# Patient Record
Sex: MALE | Race: BLACK OR AFRICAN AMERICAN | Employment: FULL TIME | URBAN - METROPOLITAN AREA
[De-identification: names, ages, dates, MRNs, and addresses within clinical notes are randomized per-mention and may not be internally consistent; named-entity substitution may affect disease eponyms.]

---

## 2021-02-14 NOTE — PROGRESS NOTES
Lilia Gregory is a 31 y.o. male who was seen by synchronous (real-time) audio-video technology on 2/15/2021 for Establish Care and Headache (off and on maybe for 1 year)    Assessment & Plan:     1. Episodic tension-type headache, not intractable  Assessment & Plan:  Stable but persists, trial NSAIDs for now, take with food  DO NOT take any OTC ibuprofen products    Orders:  -     METABOLIC PANEL, COMPREHENSIVE; Future  -     CBC WITH AUTOMATED DIFF; Future  -     naproxen (NAPROSYN) 500 mg tablet; Take 1 Tab by mouth two (2) times daily (with meals) for 30 days. , Normal, Disp-60 Tab, R-0    2. Other tobacco product nicotine dependence, uncomplicated  Assessment & Plan:  Cessation advised    Orders:  -     CBC WITH AUTOMATED DIFF; Future    3. Screening for lipid disorders  -     LIPID PANEL; Future    4. Encounter to establish care  Comments:  Complete fasting labs as ordered     Follow-up and Dispositions    · Return in about 4 weeks (around 3/15/2021) for headaches, lab results.        SUBJECTIVE:     HPI    Previous PCP: None  Reason for switching:  N/A    Social Hx:  Occupation-  and business owner  Household- lives with family, , has a daughter (10 yo)    Past Medical Hx:  None     Surgical Hx:  None     Specialists:  None    Family Hx:  HTN- father  Diabetes- none  HLD- none  MI- none  Stroke - none  CAD- none  Cancer- none  Mental Health Disorder- none    Preventive:  Last eye exam- 2020  Last dental exam- a year ago  Flu vaccine- due now, recommended  Tetanus vaccine - within the last 10 years  Pneumonia vaccine- due now, recommended  MyChart Activation- already activated    HEALTH CONCERNS:    Headaches/Migraines-  Onset: 1 year ago, on and off  Location: frontal  Duration: up to 24 hours  Characteristics: throbbing  Frequency: 2-3 times per week  Associated symptoms:  none  Aggravating factors:  lights  Relieving factors: sleeping  Treatment: BC powder  Pain Ratin/10 today, but usually /10  Admits to some caffeine use    Nicotine Dependence-  Years of smokin years  Smoking cigars  Amount:  2 cigars per day  Previous attempt to quit:  Yes, motivated to stop again  Past medications used for cessation:  no    Patient Active Problem List   Diagnosis Code    Episodic tension-type headache, not intractable G44.219    Nicotine dependence F17.200       No Known Allergies   History reviewed. No pertinent past medical history.    Social History     Socioeconomic History    Marital status:      Spouse name: Not on file    Number of children: Not on file    Years of education: Not on file    Highest education level: Not on file   Occupational History    Not on file   Social Needs    Financial resource strain: Not on file    Food insecurity     Worry: Not on file     Inability: Not on file    Transportation needs     Medical: Not on file     Non-medical: Not on file   Tobacco Use    Smoking status: Current Every Day Smoker     Years: 4.00     Types: Cigars    Smokeless tobacco: Never Used   Substance and Sexual Activity    Alcohol use: Yes     Frequency: 2-4 times a month     Drinks per session: 3 or 4     Binge frequency: Less than monthly    Drug use: Never    Sexual activity: Yes     Partners: Female   Lifestyle    Physical activity     Days per week: Not on file     Minutes per session: Not on file    Stress: Not on file   Relationships    Social connections     Talks on phone: Not on file     Gets together: Not on file     Attends [employer] service: Not on file     Active member of club or organization: Not on file     Attends meetings of clubs or organizations: Not on file     Relationship status: Not on file    Intimate partner violence     Fear of current or ex partner: Not on file     Emotionally abused: Not on file     Physically abused: Not on file     Forced sexual activity: Not on file   Other Topics Concern    Not on file   Social History Narrative    Not on file      History reviewed. No pertinent surgical history. Family History   Problem Relation Age of Onset    Hypertension Father         Review of Systems   Constitutional: Negative for chills, fever and malaise/fatigue. Eyes: Negative for blurred vision and double vision. Respiratory: Negative for shortness of breath. Cardiovascular: Negative for chest pain. Gastrointestinal: Negative for nausea and vomiting. Neurological: Positive for headaches. Negative for dizziness and weakness. OBJECTIVE:     Physical Exam   Constitutional: Stable-appearing, in no distress, alert and oriented  HENT:   Head: Normocephalic and atraumatic. Ears:  Hearing grossly intact. Mouth:  No visible perioral lesions, cyanosis, or lip swelling. Pulmonary/Chest: Does not appear dyspneic, no audible wheezes or nasal flaring. Musculoskeletal: Grossly normal active ROM in upper extremities. Neurological:  Intact recent memory, no facial or eyelid drooping, no speech impairment, answering questions appropriately. Psychiatric: Judgment and insight good, normal mood and affect. We discussed the expected course, resolution and complications of the diagnosis(es) in detail. Medication risks, benefits, costs, interactions, and alternatives were discussed as indicated. I advised him to contact the office if his condition worsens, changes or fails to improve as anticipated. He expressed understanding with the diagnosis(es) and plan. Savita Alfred, who was evaluated through a synchronous (real-time) audio-video encounter, and/or his healthcare decision maker, is aware that it is a billable service, with coverage as determined by his insurance carrier. provided verbal consent to proceed: Yes, and patient identification was verified.  It was conducted pursuant to the emergency declaration under the [facility], [facility] waiver authority and the Coronavirus Preparedness and Response Supplemental Appropriations Act. A caregiver was present when appropriate. Ability to conduct physical exam was limited. I was at home. The patient was at home.     MONSE Kothair

## 2021-02-15 ENCOUNTER — VIRTUAL VISIT (OUTPATIENT)
Dept: FAMILY MEDICINE CLINIC | Age: 26
End: 2021-02-15
Payer: COMMERCIAL

## 2021-02-15 DIAGNOSIS — Z13.220 SCREENING FOR LIPID DISORDERS: ICD-10-CM

## 2021-02-15 DIAGNOSIS — F17.290 OTHER TOBACCO PRODUCT NICOTINE DEPENDENCE, UNCOMPLICATED: ICD-10-CM

## 2021-02-15 DIAGNOSIS — Z76.89 ENCOUNTER TO ESTABLISH CARE: ICD-10-CM

## 2021-02-15 DIAGNOSIS — G44.219 EPISODIC TENSION-TYPE HEADACHE, NOT INTRACTABLE: Primary | ICD-10-CM

## 2021-02-15 PROBLEM — F17.200 NICOTINE DEPENDENCE: Status: ACTIVE | Noted: 2021-02-15

## 2021-02-15 PROCEDURE — 99203 OFFICE O/P NEW LOW 30 MIN: CPT | Performed by: NURSE PRACTITIONER

## 2021-02-15 RX ORDER — NAPROXEN 500 MG/1
500 TABLET ORAL 2 TIMES DAILY WITH MEALS
Qty: 60 TAB | Refills: 0 | Status: SHIPPED | OUTPATIENT
Start: 2021-02-15 | End: 2021-03-17

## 2021-02-15 NOTE — PROGRESS NOTES
Reno Aguirre presents today for   Chief Complaint   Patient presents with   [facility]    Headache     off and on maybe for 1 year       Reno Aguirre preferred language for health care discussion is english/other. Is someone accompanying this pt? no    Is the patient using any DME equipment during [address]? no    Depression Screening:  3 most recent PHQ Screens 2/15/2021   Little interest or pleasure in doing things Not at all   Feeling down, depressed, irritable, or hopeless Not at all   Total Score PHQ 2 0       Learning Assessment:  Learning Assessment 2/15/2021   PRIMARY LEARNER Patient   HIGHEST LEVEL OF EDUCATION - PRIMARY LEARNER  [id number] PRIMARY LEARNER NONE   CO-LEARNER CAREGIVER No   PRIMARY LANGUAGE ENGLISH    NEED No   LEARNER PREFERENCE PRIMARY DEMONSTRATION   ANSWERED BY patient   RELATIONSHIP SELF       Abuse Screening:  Abuse Screening Questionnaire 2/15/2021   Do you ever feel afraid of your partner? N   Are you in a relationship with someone who physically or mentally threatens you? N   Is it safe for you to go home? Y       Generalized Anxiety  No flowsheet data found. Health Maintenance Due   Topic Date Due    COVID-19 Vaccine (1 of 2) 02/14/2010    DTaP/Tdap/Td series (1 - Tdap) 02/14/2015    Flu Vaccine (1) 09/01/2020   . Health Maintenance reviewed and discussed and ordered per Provider. Advance Directive:  1. Do you have an advance directive in place?  Patient Reply:no

## 2021-12-07 ENCOUNTER — OFFICE VISIT (OUTPATIENT)
Dept: INTERNAL MEDICINE CLINIC | Age: 26
End: 2021-12-07
Payer: MEDICAID

## 2021-12-07 VITALS
BODY MASS INDEX: 33.3 KG/M2 | WEIGHT: 224.8 LBS | HEART RATE: 75 BPM | TEMPERATURE: 97.5 F | SYSTOLIC BLOOD PRESSURE: 135 MMHG | DIASTOLIC BLOOD PRESSURE: 75 MMHG | RESPIRATION RATE: 18 BRPM | OXYGEN SATURATION: 99 % | HEIGHT: 69 IN

## 2021-12-07 DIAGNOSIS — Z00.00 ANNUAL PHYSICAL EXAM: ICD-10-CM

## 2021-12-07 DIAGNOSIS — Z72.0 TOBACCO ABUSE: ICD-10-CM

## 2021-12-07 DIAGNOSIS — E66.9 OBESITY (BMI 30-39.9): ICD-10-CM

## 2021-12-07 DIAGNOSIS — G44.219 EPISODIC TENSION-TYPE HEADACHE, NOT INTRACTABLE: Primary | ICD-10-CM

## 2021-12-07 DIAGNOSIS — R63.5 WEIGHT GAIN: ICD-10-CM

## 2021-12-07 PROCEDURE — 99204 OFFICE O/P NEW MOD 45 MIN: CPT | Performed by: INTERNAL MEDICINE

## 2021-12-07 PROCEDURE — 99385 PREV VISIT NEW AGE 18-39: CPT | Performed by: INTERNAL MEDICINE

## 2021-12-07 RX ORDER — BUPROPION HYDROCHLORIDE 150 MG/1
150 TABLET, EXTENDED RELEASE ORAL 2 TIMES DAILY
Qty: 53 TABLET | Refills: 0 | Status: SHIPPED | OUTPATIENT
Start: 2021-12-07

## 2021-12-07 RX ORDER — METHOCARBAMOL 500 MG/1
500 TABLET, FILM COATED ORAL 2 TIMES DAILY
Qty: 60 TABLET | Refills: 2 | Status: SHIPPED | OUTPATIENT
Start: 2021-12-07

## 2021-12-07 NOTE — PROGRESS NOTES
1. Episodic tension-type headache, not intractable  An acute on chronic problem. We discussed at length the need for him to start working out again especially given that he did not have these tension headaches while he worked out. He also needs to stretch aggressively and I demonstrated how to do that. I am also going to start Robaxin that he can take up to twice daily. - methocarbamoL (ROBAXIN) 500 mg tablet; Take 1 Tablet by mouth two (2) times a day. Dispense: 60 Tablet; Refill: 2    2. Tobacco abuse  This is an acute problem. We discussed his tobacco use and we will start Zyban 150 mg daily for 7 days and will increase the dose to twice daily for the remainder. We are going to set up a virtual follow-up visit within the next 4 weeks  - buPROPion SR (WELLBUTRIN SR) 150 mg SR tablet; Take 1 Tablet by mouth two (2) times a day. 1 tab daily for 7 days. Then 1 tablet twice a day thereafter. Dispense: 53 Tablet; Refill: 0    3. Obesity (BMI 30-39. 9)  Discussed his nutrition at length. I am concerned regarding his weight gain. He has a strong family history of diabetes. Will refer to nutrition  - REFERRAL TO NUTRITION    4. Weight gain  He reports over 30 pounds of weight loss over the past year. Additionally he has a family history of multiple medical problems. I am going to check a TSH to make sure his thyroid is normal  - TSH 3RD GENERATION    5. Annual physical exam  The patient elected to have his annual physical exam done today. He is aware that there will be 2 charges. 1 for the new patient encounter. The other will be for his annual physical which should be covered by his insurance plan. I am going to check the labs noted below. I also educated him on testicular self exams.   He declined a testicular self check today  - HEMOGLOBIN A1C WITH EAG  - METABOLIC PANEL, COMPREHENSIVE  - LIPID PANEL  - CBC WITH AUTOMATED DIFF      Chief Complaint   Patient presents with    New Patient        HPI This is a very pleasant 58-year-old gentleman with a history of obesity weight gain and tension headaches. He reports over the past several months he has been having these headaches 2-3 times per month. They start over his forehead then and packed the middle part of his head and then he feels it in his neck. 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 He also reports she has gained about 30 pounds over the past year. He denies any vision changes or paresthesias in his extremity. He denies any chest pain palpitations or shortness of breath. He admits his weight is become a problem and he has a significant family history of diabetes poorly controlled hypertension and also possible hypothyroidism. The patient also reports he has been smoking quite a bit. He is actively seeking treatment for this and we had a long discussion regarding his tobacco use  Patient Active Problem List   Diagnosis Code    Episodic tension-type headache, not intractable G44.219    Nicotine dependence F17.200        No current outpatient medications on file prior to visit. No current facility-administered medications on file prior to visit. ROS  - GEN: + weight gain, no fevers or chills  - HEENT: no vision changes, no tinnitus, no sore throat  - CV: no cp, palpitations or edema  - RESP: no sob, cough  - ABD: no n/v/d, no blood in stool  - : no dysuria or changes in freq. - SKIN: no rashes, ulcers  - Neuro: no resting tremors, parasthesia in extremities, + headaches  - MS: No weakness in extremities, no gait abnormalities  - Psych: negative for depression or anxiety      Visit Vitals  /75   Pulse 75   Temp 97.5 °F (36.4 °C)   Resp 18   Ht 5' 9\" (1.753 m)   Wt 224 lb 12.8 oz (102 kg)   SpO2 99%   BMI 33.20 kg/m²           Physical Exam  Constitutional:       Appearance: Normal appearance. Morbidly obese. NAD and pleasant  HENT:      Head: Normocephalic. Nose: Nose normal.      Mouth/Throat:      Mouth: Mucous membranes are moist. Throat not inflammed  Eyes:      Extraocular Movements: Extraocular movements intact. Conjunctiva/sclera: Conjunctivae normal. Sclera anicteric     Pupils: Pupils are equal, round, and reactive to light. Cardiovascular:      Rate and Rhythm: Normal rate and regular rhythm. Pulses: Normal pulses. Pulmonary:      Effort: No respiratory distress. Breath sounds: CTAB and No stridor. No rhonchi. Abdominal:      General: There is no distension. NT, ND  Neurological:      Mental Status: patient is alert and oriented times 3.  No resting tremor, normal gait     Cranial Nerves: cranial nerves grossly intact  Muskuloskeletal     Full ROM in extremities     Normal gait  Skin     Dry without lesions on examined areas, warm to the touch       Deferred  Psychiatry     Calm, normal affect, interacting normally

## 2022-01-04 ENCOUNTER — VIRTUAL VISIT (OUTPATIENT)
Dept: INTERNAL MEDICINE CLINIC | Age: 27
End: 2022-01-04
Payer: MEDICAID

## 2022-01-04 DIAGNOSIS — E66.9 OBESITY (BMI 30-39.9): ICD-10-CM

## 2022-01-04 DIAGNOSIS — Z72.0 TOBACCO ABUSE: Primary | ICD-10-CM

## 2022-01-04 PROCEDURE — 99442 PR PHYS/QHP TELEPHONE EVALUATION 11-20 MIN: CPT | Performed by: INTERNAL MEDICINE

## 2022-01-04 NOTE — PROGRESS NOTES
I was at my office in [place], [place] while conducting this encounter. The patient is at home. Consent:  Patient and/or HIS/HER healthcare decision maker is aware that this patient-initiated Telehealth encounter is a billable service, with coverage as determined by patient's insurance carrier. Patient is aware that He/She may receive a bill and has provided verbal consent to proceed: Consent has been obtained within past 12 months of the date of this encounter. This virtual visit was conducted via Telephone    1. Tobacco abuse  The patient has quit smoking and I am so happy and proud of him. He did a cold turkey    2. Obesity (BMI 30-39. 9)  Discussed weight reduction. I have set a short-term goal of 15 pounds of weight loss. At some point I would like to see him below 180 and possibly as low as 170. He is going to start working on that       Chief Complaint   Patient presents with    Follow-up     smoking sessation        HPI   This is a pleasant 48-year-old gentleman who presents for follow-up after he had a smoking cessation discussion during our last meeting. He reports he is cut down completely and has not smoked at all. He literally did a cold turkey. He even stopped taking the Zyban I prescribed for him. He reports he is turned his sun seeds as his go to [employer]. Otherwise reports he is doing well. He is also looking forward to the new year and losing some weight. He and I did discuss weight reduction  Current Outpatient Medications on File Prior to Visit   Medication Sig Dispense Refill    buPROPion SR (WELLBUTRIN SR) 150 mg SR tablet Take 1 Tablet by mouth two (2) times a day. 1 tab daily for 7 days. Then 1 tablet twice a day thereafter. (Patient not taking: Reported on 1/4/2022) 53 Tablet 0    methocarbamoL (ROBAXIN) 500 mg tablet Take 1 Tablet by mouth two (2) times a day.  (Patient not taking: Reported on 1/4/2022) 60 Tablet 2     No current facility-administered medications on file prior to visit.         Patient Active Problem List   Diagnosis Code    Episodic tension-type headache, not intractable G44.219    Nicotine dependence F17.200             Total Time Spent on this Encounter: total time spent was approx 12 minutes

## 2022-01-04 NOTE — PROGRESS NOTES
patient states he has stopped smoking and doesn't even take the pills to stop smoking      Kesha Donaldson presents today for   Chief Complaint   Patient presents with    Follow-up     smoking sessation       Depression Screening:  3 most recent PHQ Screens 1/4/2022   Little interest or pleasure in doing things Not at all   Feeling down, depressed, irritable, or hopeless Not at all   Total Score PHQ 2 0       Learning Assessment:  Learning Assessment 2/15/2021   PRIMARY LEARNER Patient   HIGHEST LEVEL OF EDUCATION - PRIMARY LEARNER  [employer]   [employer] PRIMARY LEARNER NONE   CO-LEARNER CAREGIVER No   PRIMARY LANGUAGE ENGLISH    NEED No   LEARNER PREFERENCE PRIMARY DEMONSTRATION   ANSWERED BY patient   RELATIONSHIP SELF       Health Maintenance reviewed and discussed and ordered per Provider. Health Maintenance Due   Topic Date Due    Hepatitis C Screening  Never done    COVID-19 Vaccine (1) Never done    Pneumococcal 0-64 years (1 of 2 - PPSV23) Never done    DTaP/Tdap/Td series (1 - Tdap) Never done    Flu Vaccine (1) Never done   . Coordination of Care:  1. \"Have you been to the ER, urgent care clinic since your last visit? Hospitalized since your last visit? 12/22/2021 for positive Covid. 2. \"Have you seen or consulted any other health care providers outside of the [address] [place] [address] since your last visit? \" Yes Where[facility]     3. For patients aged 39-70: Has the patient had a colonoscopy? NA based on age or sex     If the patient is female:    3. For patients aged 41-77: Has the patient had a mammogram within the past 2 years? NA based on age or sex    11. For patients aged 21-65: Has the patient had a pap smear? NA based on age or sex .